# Patient Record
Sex: MALE | Race: BLACK OR AFRICAN AMERICAN | NOT HISPANIC OR LATINO
[De-identification: names, ages, dates, MRNs, and addresses within clinical notes are randomized per-mention and may not be internally consistent; named-entity substitution may affect disease eponyms.]

---

## 2017-03-10 PROBLEM — Z00.00 ENCOUNTER FOR PREVENTIVE HEALTH EXAMINATION: Status: ACTIVE | Noted: 2017-03-10

## 2017-03-14 ENCOUNTER — APPOINTMENT (OUTPATIENT)
Dept: CARDIOLOGY | Facility: CLINIC | Age: 52
End: 2017-03-14

## 2017-04-07 ENCOUNTER — RESULT REVIEW (OUTPATIENT)
Age: 52
End: 2017-04-07

## 2017-04-25 ENCOUNTER — APPOINTMENT (OUTPATIENT)
Dept: CARDIOLOGY | Facility: CLINIC | Age: 52
End: 2017-04-25

## 2017-05-10 ENCOUNTER — OUTPATIENT (OUTPATIENT)
Dept: OUTPATIENT SERVICES | Facility: HOSPITAL | Age: 52
LOS: 1 days | Discharge: HOME | End: 2017-05-10
Payer: COMMERCIAL

## 2017-05-10 ENCOUNTER — RESULT REVIEW (OUTPATIENT)
Age: 52
End: 2017-05-10

## 2017-05-10 DIAGNOSIS — K63.5 POLYP OF COLON: ICD-10-CM

## 2017-05-10 PROCEDURE — 88305 TISSUE EXAM BY PATHOLOGIST: CPT | Mod: 26

## 2017-05-12 LAB — SURGICAL PATHOLOGY STUDY: SIGNIFICANT CHANGE UP

## 2022-04-04 ENCOUNTER — APPOINTMENT (OUTPATIENT)
Dept: PAIN MANAGEMENT | Facility: CLINIC | Age: 57
End: 2022-04-04
Payer: COMMERCIAL

## 2022-04-04 VITALS — WEIGHT: 220 LBS

## 2022-04-04 DIAGNOSIS — I10 ESSENTIAL (PRIMARY) HYPERTENSION: ICD-10-CM

## 2022-04-04 PROCEDURE — 99214 OFFICE O/P EST MOD 30 MIN: CPT

## 2022-04-04 NOTE — HISTORY OF PRESENT ILLNESS
[Lower back] : lower back [8] : 8 [0] : 0 [Radiating] : radiating [Sharp] : sharp [] : yes [Intermittent] : intermittent [Leisure] : leisure [Sleep] : sleep [Rest] : rest [Meds] : meds [Sitting] : sitting [Injection therapy] : injection therapy [Walking] : walking [Stairs] : stairs [de-identified] : 4/4/22: pain started 10 days ago and is on the right lower back and radiates down the right leg to the toes described as a stabbing pain. \par \par 2/14/22: s/p right L5-S1 TFESI on 1/31/22 with 60% relief and improvement of ADLs. \par \par 12/28/21: s/p L5-S1 LESI on 12/13/21 with 60% relief and improvement of ADLs. pt reports having hiccups for a week after the injection. Still getting some pain in the right ankle. \par \par Initial HPI:\par Pain started 2 months ago and is across the lower back and radiates down right lower leg to the ankle described as a tight/ throbbing pain. Starting get some pain in the left leg as well when standing for long periods. No numbness/tingling. Completed MDP with no relief and takes Tylenol PRN. Will be starting PT next week. [FreeTextEntry1] : center  [FreeTextEntry5] : pt fell  [FreeTextEntry7] : b/l knee and right heel  [de-identified] : getting up

## 2022-04-04 NOTE — DISCUSSION/SUMMARY
[de-identified] : 1. Discussion\par After discussing various treatment options with the patient including but not limited to oral medications, physical therapy, exercise modalities as well as interventional spinal injections, we have decided with the following plan:\par \par 2. Pain Management Injection Risk/Benefit Discussion\par The risks, benefits and alternatives of the proposed procedure were explained in detail with the patient. The risks outlined include but are not limited to infection, bleeding, Post-Dural puncture headache, nerve injury, a temporary increase in pain, failure to resolve symptoms, allergic reaction, symptom recurrence, and possible elevation of blood sugar in diabetics. All questions were answered to patient's apparent satisfaction and he/she verbalized an understanding.\par \par 3. Post Injection\par Follow up in 1-2 weeks post injection for re-evaluation. \par \par 4. Conservative Care\par Continue Home exercises, stretching, activity modification, physical therapy, and conservative care.\par \par 5. PERRY\par Patient is presenting with acute/sub-acute radicular pain with impairment in ADLs and functionality.  The pain has not responded to conservative care including NSAID therapy and/or physical therapy.  There is no bleeding tendency, unstable medical condition, or systemic infection.\par \par Will schedule for Right L5-S1 TFESI.

## 2022-04-04 NOTE — PHYSICAL EXAM
[de-identified] : Constitutional; Appears well, no apparent distress\par Ability to communicate: Normal \par Respiratory: non-labored breathing\par Skin: No rash noted\par Head: Normocephalic, atraumatic\par Neck: no visible thyroid enlargement\par Eyes: Extraocular movements intact\par Neurologic: Alert and oriented x3\par Psychiatric: normal mood, affect and behavior \par \par Back, including spine: Inspection of the thoracic/lumbar spine is as follows: No atrophy and erythema. Palpation of\par the thoracic/lumbar spine is as follows: no lumbar paraspinal spasm. Range of motion of the thoracic and lumbar\par spine is as follows: full range of motion with mild stiffness. Strength Testing of the thoracic and lumbar spine is as\par follows: motor exam is 5/5 throughout both lower extremities with normal tone Special testing of the thoracic and\par lumbar spine is as follows: +straight leg raise on the right. Neurological testing of the thoracic and lumbar spine is as follows: light touch is intact throughout both lower extremities Gait and function is as follows: nonantalgic\par gait.

## 2022-04-18 ENCOUNTER — APPOINTMENT (OUTPATIENT)
Dept: PAIN MANAGEMENT | Facility: CLINIC | Age: 57
End: 2022-04-18
Payer: COMMERCIAL

## 2022-04-18 DIAGNOSIS — M54.17 RADICULOPATHY, LUMBOSACRAL REGION: ICD-10-CM

## 2022-04-18 PROCEDURE — J3490M: CUSTOM

## 2022-04-18 PROCEDURE — 64483 NJX AA&/STRD TFRM EPI L/S 1: CPT | Mod: RT

## 2022-04-18 NOTE — PROCEDURE
[FreeTextEntry3] : Date of Service: 04/18/2022 \par \par Account: [id number]\par \par Patient: BRENDA MOSES \par \par YOB: 1965\par \par Age: 56 year\par \par \par Surgeon:                                                         Raffy Carias D.O. \par \par Assistant:                                                        None.\par \par Pre-Operative Diagnosis:                            Lumbosacral radiculitis (M54.17)\par \par Post-Operative Diagnosis:                          Same\par \par Procedure:                                                     Right L5-S1 transforaminal epidural steroid injection under fluoroscopic guidance.\par \par Anesthesia:                                                    Local with MAC\par \par \par This procedure was carried out using fluoroscopic guidance.  The risks and benefits of the procedure were discussed extensively with the patient.  The consent of the patient was obtained and the following procedure was performed. The patient was placed in the prone position on the fluoroscopic table and the lumbar area was prepped and draped in a sterile fashion. A timeout was performed with all essential staff present and the site and side were verified.\par \par The right L5-S1 neural foramen was then identified on right oblique "nilesh dog" anatomical view at the 6 o'clock position using fluoroscopic guidance, and the area was marked. The overlying skin and subcutaneous structures were anesthetized using sterile technique with 1% Lidocaine.  A 22-gauge spinal needle was directed toward the inferior (6 o'clock) position of the pedicle, which formed the roof of the identified foramen.  Once in the epidural space, after negative aspiration for heme and CSF, 1 cc of Omnipaque contrast was injected under live fluoroscopy to confirm epidural location and assess filling defects and rule out intravascular needle placement. \par \par The following contrast flow and epidurogram was observed: no intravascular or intrathecal flow pattern was noted.  No blood or CSF was aspirated. Contrast spread appeared to outline the right L5 nerve root and spread medially into the epidural space.  \par \par After this, an injectate of 1 cc preservative free normal saline plus 6 mg of betamethasone and 1 cc of 0.25% bupivacaine was injected in the epidural space.\par \par The needle was subsequently removed.  Vital signs remained normal.  Pulse oximeter was used throughout the procedure and the patient's pulse and oxygen saturation remained within normal limits.  The patient tolerated the procedure well.  There were no complications.  The patient was instructed to apply ice over the injection sites for twenty minutes every two hours for the next 24 to 48 hours.  The patient was also instructed to contact me immediately if there were any problems.\par \par \par Raffy Carias D.O.\par

## 2022-04-22 ENCOUNTER — APPOINTMENT (OUTPATIENT)
Dept: ORTHOPEDIC SURGERY | Facility: CLINIC | Age: 57
End: 2022-04-22

## 2022-05-02 ENCOUNTER — APPOINTMENT (OUTPATIENT)
Dept: PAIN MANAGEMENT | Facility: CLINIC | Age: 57
End: 2022-05-02